# Patient Record
Sex: FEMALE | Race: AMERICAN INDIAN OR ALASKA NATIVE
[De-identification: names, ages, dates, MRNs, and addresses within clinical notes are randomized per-mention and may not be internally consistent; named-entity substitution may affect disease eponyms.]

---

## 2020-10-07 ENCOUNTER — HOSPITAL ENCOUNTER (EMERGENCY)
Dept: HOSPITAL 5 - ED | Age: 35
LOS: 1 days | Discharge: HOME | End: 2020-10-08
Payer: MEDICAID

## 2020-10-07 VITALS — DIASTOLIC BLOOD PRESSURE: 68 MMHG | SYSTOLIC BLOOD PRESSURE: 131 MMHG
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
## 2020-10-07 DIAGNOSIS — Z79.899: ICD-10-CM
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
## 2020-10-07 DIAGNOSIS — S93.402A: Primary | ICD-10-CM
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
## 2020-10-07 DIAGNOSIS — Z79.1: ICD-10-CM

## 2020-10-07 DIAGNOSIS — Z98.890: ICD-10-CM

## 2020-10-07 NOTE — EMERGENCY DEPARTMENT REPORT
Blank Doc





- Documentation


Documentation: 





35-year-old female that presents with left ankle pain s/p twisting it.





This initial assessment/diagnostic orders/clinical plan/treatment(s) is/are 

subject to change based on patient's health status, clinical progression and re-

assessment by fellow clinical providers in the ED.  Further treatment and workup

at subsequent clinical providers discretion.  Patient/guardians urged not to 

elope from the ED as their condition may be serious if not clinically assessed 

and managed.  Initial orders include:


1- Patient sent to [facility] for further evaluation and treatment


2- xrays

## 2020-10-07 NOTE — XRAY REPORT
LEFT ANKLE 3 VIEWS



INDICATION / CLINICAL INFORMATION:

Left ankle pain and numbness after fall yesterday.



COMPARISON:

None available.

 

FINDINGS:



BONES and JOINT(S): No acute fracture or subluxation. No significant arthritis.

SOFT TISSUES: There is mild edema anteriorly and laterally.



ADDITIONAL FINDINGS: None.



IMPRESSION:

Mild left ankle edema.



Signer Name: Curtis Ferguson MD 

Signed: 10/7/2020 9:50 PM

Workstation Name: [id number]

## 2020-10-08 NOTE — EMERGENCY DEPARTMENT REPORT
ED Lower Extremity HPI





- General


Chief Complaint: Extremity Injury, Lower


Stated Complaint: LEFT ANKLE INJURY/FALL


Time Seen by Provider: 10/07/20 20:05


Source: patient


Mode of arrival: Ambulatory


Limitations: No Limitations





- History of Present Illness


Initial Comments: 





Patient is a 35-year-old -American female with no past medical history 

presents to the ED with complaint of acute onset persistent severe left ankle 

pain with swelling after she slipped and twisted her left ankle while rushing 

out of her house and missed a step 4 days ago.  Patient states that the pain and

swelling of worsened in the last 2 days.  Patient states that she has been 

taking over-the-counter pain medications with no relief.  Patient denies head or

neck injuries, dizziness, syncope, seizures, chest pain or shortness of breath, 

back pain, hip pain, headache, numbness and tingling or weakness of left leg or 

knee pain.


MD Complaint: ankle injury (left ankle pain), fall


-: Sudden, days(s) (4)


Injury: Ankle: Left (Left ankle pain)


Type of Injury: blunt, inversion, eversion


Place: home


Severity: severe


Severity scale (0 -10): 8


Improves With: nothing


Worsens With: weight bearing, movement, palpation


Context: fall, direct blow, walking


Associated Symptoms: snap/pop sensation, swelling, able to partially bear 

weight.  denies: numbness, tingling, unable to bear weight, ambulatory





- Related Data


                                  Previous Rx's











 Medication  Instructions  Recorded  Last Taken  Type


 


Ibuprofen [Motrin] 800 mg PO Q8HR PRN #30 tablet 10/08/20 Unknown Rx


 


methOCARBAMOL [Robaxin TAB] 750 mg PO Q8H PRN #24 tablet 10/08/20 Unknown Rx


 


traMADoL [Ultram] 50 mg PO Q6HR PRN #12 tablet 10/08/20 Unknown Rx











                                    Allergies











Allergy/AdvReac Type Severity Reaction Status Date / Time


 


No Known Allergies Allergy   Unverified 10/07/20 20:05














ED Review of Systems


ROS: 


Stated complaint: LEFT ANKLE INJURY/FALL


Other details as noted in HPI





Constitutional: denies: chills, fever


Eyes: denies: eye pain, eye discharge, vision change


ENT: denies: ear pain, throat pain


Respiratory: denies: cough, shortness of breath, wheezing


Cardiovascular: denies: chest pain, palpitations


Endocrine: no symptoms reported


Gastrointestinal: denies: abdominal pain, nausea, diarrhea


Genitourinary: denies: urgency, dysuria, discharge


Musculoskeletal: joint swelling (left ankle pain and swelling), arthralgia (left

 ankle pain and swelling).  denies: back pain


Skin: denies: rash, lesions


Neurological: denies: headache, weakness, paresthesias


Psychiatric: denies: anxiety, depression


Hematological/Lymphatic: denies: easy bleeding, easy bruising





ED Past Medical Hx





- Past Medical History


Previous Medical History?: No





- Surgical History


Additional Surgical History: fallopian tube removal,





- Social History


Smoking Status: Never Smoker


Substance Use Type: None





- Medications


Home Medications: 


                                Home Medications











 Medication  Instructions  Recorded  Confirmed  Last Taken  Type


 


Ibuprofen [Motrin] 800 mg PO Q8HR PRN #30 tablet 10/08/20  Unknown Rx


 


methOCARBAMOL [Robaxin TAB] 750 mg PO Q8H PRN #24 tablet 10/08/20  Unknown Rx


 


traMADoL [Ultram] 50 mg PO Q6HR PRN #12 tablet 10/08/20  Unknown Rx














ED Physical Exam





- General


Limitations: No Limitations


General appearance: alert, in no apparent distress





- Head


Head exam: Present: atraumatic, normocephalic, normal inspection





- Eye


Eye exam: Present: normal appearance, PERRL, EOMI


Pupils: Present: normal accommodation





- ENT


ENT exam: Present: normal exam, normal orophraynx, mucous membranes moist, TM's 

normal bilaterally, normal external ear exam





- Neck


Neck exam: Present: normal inspection, full ROM.  Absent: tenderness, 

lymphadenopathy





- Respiratory


Respiratory exam: Present: normal lung sounds bilaterally.  Absent: respiratory 

distress, wheezes, rales, rhonchi, chest wall tenderness, accessory muscle use, 

decreased breath sounds





- Cardiovascular


Cardiovascular Exam: Present: regular rate, normal rhythm, normal heart sounds. 

 Absent: systolic murmur, diastolic murmur, rubs, gallop





- GI/Abdominal


GI/Abdominal exam: Present: soft, normal bowel sounds.  Absent: tenderness, 

guarding, rebound, hyperactive bowel sounds, hypoactive bowel sounds, 

organomegaly





- Extremities Exam


Extremities exam: Present: normal inspection, full ROM, tenderness (Palpable 

left ankle tenderness and mild swelling), normal capillary refill, joint 

swelling (left ankle swelling).  Absent: calf tenderness





- Back Exam


Back exam: Present: normal inspection, full ROM.  Absent: tenderness, CVA 

tenderness (R), CVA tenderness (L), muscle spasm, paraspinal tenderness





- Neurological Exam


Neurological exam: Present: alert, oriented X3, CN II-XII intact, normal gait, 

reflexes normal





- Psychiatric


Psychiatric exam: Present: normal affect, normal mood





- Skin


Skin exam: Present: warm, dry, intact, normal color.  Absent: rash





ED Course





                                   Vital Signs











  10/07/20





  20:05


 


Temperature 98.5 F


 


Pulse Rate 100 H


 


Respiratory 18





Rate 


 


Blood Pressure 131/68


 


O2 Sat by Pulse 100





Oximetry 














ED Lower Extremity MDM





- Radiology Data


Radiology results: report reviewed, image reviewed





Findings





[facility] 


[address] [place] [address] 





XRay Report 


Signed 





 Patient: KURTIS HUBER MR#: [id number] 


 [id number] 


 : 1985 Acct:[id number] 





 Age/Sex: 35 / F ADM Date: 10/07/20 





 Loc: ED 


 Attending Dr: 








 Ordering Physician: LOLIS WU NP 


 Date of Service: 10/07/20 


 Procedure(s): XR ankle 3+V LT 


 Accession Number(s): [id number] 





 cc: LOLIS WU NP 





 Fluoro Time In Minutes: 





 LEFT ANKLE 3 VIEWS 





INDICATION / CLINICAL INFORMATION: 


Left ankle pain and numbness after fall yesterday. 





COMPARISON: 


None available. 





FINDINGS: 





BONES and JOINT(S): No acute fracture or subluxation. No significant arthritis. 


SOFT TISSUES: There is mild edema anteriorly and laterally. 





ADDITIONAL FINDINGS: None. 





IMPRESSION: 


Mild left ankle edema. 





Signer Name: Curtis Ferguson MD 


Signed: 10/7/2020 9:50 PM 


Workstation Name: [id number] 








 Transcribed By: MN 


 Dictated By: Curtis Ferguson MD 


 Electronically Authenticated By: Curtis Ferguson MD 


 Signed Date/Time: 10/07/20 2150 











 DD/DT: 10/07/20 2149 


 TD/TT: 





- Medical Decision Making





This is a 35-year-old -American female with no past medical history 

presents to the ED with complaint of acute onset persistent severe left ankle 

pain with swelling after she slipped and twisted her left ankle while rushing 

out of her house and missed a step 4 days ago.  Patient states that the pain and

 swelling of worsened in the last 2 days.  Patient states that she has been 

taking over-the-counter pain medications with no relief.  In the ED, patient is 

alert and oriented x3 and is not in distress.  Patient was treated for pain in 

the ED and left ankle x-ray shows no acute fractures or subluxations but soft 

tissue swelling.  Patient left ankle was splinted with Ace wrap and the patient 

was discharged home on pain medications and muscle relaxants and was advised to 

follow-up with her primary care physician in 5 to 7 days for reevaluation or 

return to the ED immediately if symptoms get worse.





- Differential Diagnosis


Ankle sprain; ankle fracture; muscle strain


Critical care attestation.: 


If time is entered above; I have spent that time in minutes in the direct care 

of this critically ill patient, excluding procedure time.








ED Disposition


Clinical Impression: 


Severe sprain of left ankle


Qualifiers:


 Encounter type: initial encounter Qualified Code(s): S93.402A - Sprain of 

unspecified ligament of left ankle, initial encounter





Muscle strain of left ankle


Qualifiers:


 Encounter type: initial encounter Qualified Code(s): S96.912A - Strain of 

unspecified muscle and tendon at ankle and foot level, left foot, initial 

encounter





Disposition:  TO HOME OR SELFCARE


Is pt being admited?: No


Does the pt Need Aspirin: No


Condition: Stable


Instructions:  Muscle Strain (ED), Ankle Sprain (ED)


Additional Instructions: 


The left ankle X-ray shows no acute fracture or subluxations but soft tissue 

swelling. Therefore take medications with food, drink plenty of fluids and 

follow up with your Primary Care Physician in 5-7 days for reevaluation. Return 

to the ED immediately if symptoms get worse.


Prescriptions: 


Ibuprofen [Motrin] 800 mg PO Q8HR PRN #30 tablet


 PRN Reason: Pain , Severe (7-10)


methOCARBAMOL [Robaxin TAB] 750 mg PO Q8H PRN #24 tablet


 PRN Reason: Muscle Spasm


traMADoL [Ultram] 50 mg PO Q6HR PRN #12 tablet


 PRN Reason: Pain


Referrals: 


[facility] [Provider Group] - 3-5 Days


Time of Disposition: 05:16


Print Language: ENGLISH

## 2021-11-17 ENCOUNTER — HOSPITAL ENCOUNTER (EMERGENCY)
Dept: HOSPITAL 5 - ED | Age: 36
Discharge: HOME | End: 2021-11-17
Payer: SELF-PAY

## 2021-11-17 VITALS — DIASTOLIC BLOOD PRESSURE: 67 MMHG | SYSTOLIC BLOOD PRESSURE: 108 MMHG

## 2021-11-17 DIAGNOSIS — Z3A.01: ICD-10-CM

## 2021-11-17 DIAGNOSIS — O99.511: Primary | ICD-10-CM

## 2021-11-17 DIAGNOSIS — J06.9: ICD-10-CM

## 2021-11-17 DIAGNOSIS — Z20.822: ICD-10-CM

## 2021-11-17 DIAGNOSIS — B97.89: ICD-10-CM

## 2021-11-17 LAB
ALBUMIN SERPL-MCNC: 4 G/DL (ref 3.9–5)
ALT SERPL-CCNC: 10 UNITS/L (ref 7–56)
BASOPHILS # (AUTO): 0 K/MM3 (ref 0–0.1)
BASOPHILS NFR BLD AUTO: 0.5 % (ref 0–1.8)
BUN SERPL-MCNC: 7 MG/DL (ref 7–17)
BUN/CREAT SERPL: 14 %
CALCIUM SERPL-MCNC: 9 MG/DL (ref 8.4–10.2)
EOSINOPHIL # BLD AUTO: 0.2 K/MM3 (ref 0–0.4)
EOSINOPHIL NFR BLD AUTO: 3.3 % (ref 0–4.3)
HCT VFR BLD CALC: 39.2 % (ref 30.3–42.9)
HEMOLYSIS INDEX: 6
HGB BLD-MCNC: 12.6 GM/DL (ref 10.1–14.3)
LYMPHOCYTES # BLD AUTO: 1.9 K/MM3 (ref 1.2–5.4)
LYMPHOCYTES NFR BLD AUTO: 29 % (ref 13.4–35)
MCHC RBC AUTO-ENTMCNC: 32 % (ref 30–34)
MCV RBC AUTO: 86 FL (ref 79–97)
MONOCYTES # (AUTO): 0.4 K/MM3 (ref 0–0.8)
MONOCYTES % (AUTO): 5.9 % (ref 0–7.3)
PLATELET # BLD: 392 K/MM3 (ref 140–440)
RBC # BLD AUTO: 4.58 M/MM3 (ref 3.65–5.03)

## 2021-11-17 PROCEDURE — 76817 TRANSVAGINAL US OBSTETRIC: CPT

## 2021-11-17 PROCEDURE — 76801 OB US < 14 WKS SINGLE FETUS: CPT

## 2021-11-17 PROCEDURE — 87400 INFLUENZA A/B EACH AG IA: CPT

## 2021-11-17 PROCEDURE — 81025 URINE PREGNANCY TEST: CPT

## 2021-11-17 PROCEDURE — 80053 COMPREHEN METABOLIC PANEL: CPT

## 2021-11-17 PROCEDURE — 85025 COMPLETE CBC W/AUTO DIFF WBC: CPT

## 2021-11-17 PROCEDURE — 99284 EMERGENCY DEPT VISIT MOD MDM: CPT

## 2021-11-17 PROCEDURE — 71045 X-RAY EXAM CHEST 1 VIEW: CPT

## 2021-11-17 PROCEDURE — 84702 CHORIONIC GONADOTROPIN TEST: CPT

## 2021-11-17 PROCEDURE — 36415 COLL VENOUS BLD VENIPUNCTURE: CPT

## 2021-11-17 NOTE — XRAY REPORT
CHEST 1 VIEW 11/17/2021 10:29 AM



INDICATION / CLINICAL INFORMATION: cough.



COMPARISON: None available.



FINDINGS:



SUPPORT DEVICES: None.

HEART / MEDIASTINUM: No significant abnormality. 

LUNGS / PLEURA: No significant pulmonary or pleural abnormality. Small tubular radiopaque density at 
the right lung base is nonspecific and may represent benign calcification. No pneumothorax. 



ADDITIONAL FINDINGS: No significant additional findings.



IMPRESSION:

1. Small radiopaque density at the right lung base is nonspecific, but likely benign. The remainder o
f the lungs is clear.



Signer Name: Justin Das MD 

Signed: 11/17/2021 10:42 AM

Workstation Name: [id number] [id number]

## 2021-11-17 NOTE — ULTRASOUND REPORT
ULTRASOUND OBSTETRIC 



INDICATION:

cramping, early pregnancy.



TECHNIQUE:

Transabdominal and Transvaginal.



COMPARISON:

None available.



FINDINGS:

GESTATIONAL SAC: Possible early gestational sac is seen along the uterine fundus measuring 2.8 mm, co
nsistent with an estimated age of 5 weeks, 0 days. 

YOLK SAC: Not seen.



EMBRYO/FETUS: Not seen. 





ADNEXA: No significant abnormality.

FREE FLUID: A moderate amount of septated free fluid is seen along the cul-de-sac.



ADDITIONAL FINDINGS: A hypoechoic solid mass is seen along the uterine fundus measuring 1.8 x 1.4 cm,
 possibly representing a fibroid.



IMPRESSION:

1. Possible early intrauterine gestational sac without visualization of a yolk sac or fetal pole as a
sterling. Close clinical and imaging follow-up is recommended.

2. Nonspecific moderate amount of free fluid containing septations.

3. Possible uterine fibroid as above.



Signer Name: Curtis Ferguson MD 

Signed: 11/17/2021 2:36 PM

Workstation Name: [id number]

## 2021-11-17 NOTE — ULTRASOUND REPORT
ULTRASOUND OBSTETRIC 



INDICATION:

cramping, early pregnancy.



TECHNIQUE:

Transabdominal and Transvaginal.



COMPARISON:

None available.



FINDINGS:

GESTATIONAL SAC: Possible early gestational sac is seen along the uterine fundus measuring 2.8 mm, co
nsistent with an estimated age of 5 weeks, 0 days. 

YOLK SAC: Not seen.



EMBRYO/FETUS: Not seen. 





ADNEXA: No significant abnormality.

FREE FLUID: A moderate amount of septated free fluid is seen along the cul-de-sac.



ADDITIONAL FINDINGS: A hypoechoic solid mass is seen along the uterine fundus measuring 1.8 x 1.4 cm,
 possibly representing a fibroid.



IMPRESSION:

1. Possible early intrauterine gestational sac without visualization of a yolk sac or fetal pole as a
sterling. Close clinical and imaging follow-up is recommended.

2. Nonspecific moderate amount of free fluid containing septations.

3. Possible uterine fibroid as above.



Signer Name: Curtis Ferguson MD 

Signed: 11/17/2021 2:36 PM

Workstation Name: [id number]

## 2021-11-17 NOTE — EMERGENCY DEPARTMENT REPORT
ED General Adult HPI





- General


Chief complaint: Upper Respiratory Infection


Stated complaint: FEVER


Time Seen by Provider: 11/17/21 09:10


Source: patient


Mode of arrival: Ambulatory


Limitations: No Limitations





- History of Present Illness


Initial comments: 





36-year-old -American female patient presents with complaints of cough, 

congestion, and body aches and chills x1 week.  She admits to loss of taste and 

smell.  She has not been vaccinated for COVID-19 and has not been tested for 

COVID-19.  She denies any hemoptysis, but states her cough is productive of 

green mucus.  She also reports mild shortness of breath.  No history of 

DVT/PE/cancer, hormone use, recent long travel/surgeries, or leg pain/swelling 

per patient.  She reports some nausea without vomiting and no diarrhea.  She 

states mild lower abdominal cramping, but states she has a history of 

endometriosis and that this is normal for her.  LMP was approximately 3 to 4 

weeks ago per pt.





- Related Data


                                  Previous Rx's











 Medication  Instructions  Recorded  Last Taken  Type


 


Ibuprofen [Motrin] 800 mg PO Q8HR PRN #30 tablet 10/08/20 Unknown Rx


 


methOCARBAMOL [Robaxin TAB] 750 mg PO Q8H PRN #24 tablet 10/08/20 Unknown Rx


 


traMADoL [Ultram] 50 mg PO Q6HR PRN #12 tablet 10/08/20 Unknown Rx











                                    Allergies











Allergy/AdvReac Type Severity Reaction Status Date / Time


 


No Known Allergies Allergy   Verified 11/17/21 08:16














ED Review of Systems


ROS: 


Stated complaint: FEVER


Other details as noted in HPI





Constitutional: denies: chills, fever, malaise


Gastrointestinal: denies: abdominal pain


Genitourinary: denies: frequency, hematuria


Musculoskeletal: denies: back pain


Skin: denies: rash, lesions, change in color





ED Past Medical Hx





- Surgical History


Additional Surgical History: fallopian tube removal,





- Social History


Smoking Status: Never Smoker


Substance Use Type: None





- Medications


Home Medications: 


                                Home Medications











 Medication  Instructions  Recorded  Confirmed  Last Taken  Type


 


Ibuprofen [Motrin] 800 mg PO Q8HR PRN #30 tablet 10/08/20  Unknown Rx


 


methOCARBAMOL [Robaxin TAB] 750 mg PO Q8H PRN #24 tablet 10/08/20  Unknown Rx


 


traMADoL [Ultram] 50 mg PO Q6HR PRN #12 tablet 10/08/20  Unknown Rx














ED Physical Exam





- General


Limitations: No Limitations


General appearance: alert, in no apparent distress





- Head


Head exam: Present: atraumatic, normocephalic





- Eye


Eye exam: Present: normal appearance.  Absent: scleral icterus





- Neck


Neck exam: Present: normal inspection





- Respiratory


Respiratory exam: Present: normal lung sounds bilaterally.  Absent: respiratory 

distress





- Cardiovascular


Cardiovascular Exam: Present: regular rate, normal rhythm





- GI/Abdominal


GI/Abdominal exam: Present: soft, normal bowel sounds.  Absent: distended, 

tenderness, guarding, rebound, rigid





- Neurological Exam


Neurological exam: Present: alert, oriented X3, normal gait





- Psychiatric


Psychiatric exam: Present: normal affect, normal mood





- Skin


Skin exam: Present: warm, dry, intact, normal color.  Absent: rash





ED Course


                                   Vital Signs











  11/17/21 11/17/21





  09:25 09:56


 


Temperature 98.4 F 


 


Pulse Rate 70 


 


Respiratory 18 18





Rate  


 


Blood Pressure 108/67 





[Left]  


 


O2 Sat by Pulse 99 97





Oximetry  














ED Medical Decision Making





- Lab Data


Result diagrams: 


                                 11/17/21 10:38





                                 11/17/21 10:38








                                   Lab Results











  11/17/21 11/17/21 11/17/21 Range/Units





  09:30 10:38 10:38 


 


WBC    6.6  (4.5-11.0)  K/mm3


 


RBC    4.58  (3.65-5.03)  M/mm3


 


Hgb    12.6  (10.1-14.3)  gm/dl


 


Hct    39.2  (30.3-42.9)  %


 


MCV    86  (79-97)  fl


 


MCH    28  (28-32)  pg


 


MCHC    32  (30-34)  %


 


RDW    14.8  (13.2-15.2)  %


 


Plt Count    392  (140-440)  K/mm3


 


Lymph % (Auto)    29.0  (13.4-35.0)  %


 


Mono % (Auto)    5.9  (0.0-7.3)  %


 


Eos % (Auto)    3.3  (0.0-4.3)  %


 


Baso % (Auto)    0.5  (0.0-1.8)  %


 


Lymph # (Auto)    1.9  (1.2-5.4)  K/mm3


 


Mono # (Auto)    0.4  (0.0-0.8)  K/mm3


 


Eos # (Auto)    0.2  (0.0-0.4)  K/mm3


 


Baso # (Auto)    0.0  (0.0-0.1)  K/mm3


 


Seg Neutrophils %    61.3  (40.0-70.0)  %


 


Seg Neutrophils #    4.0  (1.8-7.7)  K/mm3


 


Sodium     (137-145)  mmol/L


 


Potassium     (3.6-5.0)  mmol/L


 


Chloride     ()  mmol/L


 


Carbon Dioxide     (22-30)  mmol/L


 


Anion Gap     mmol/L


 


BUN     (7-17)  mg/dL


 


Creatinine     (0.6-1.2)  mg/dL


 


Estimated GFR     ml/min


 


BUN/Creatinine Ratio     %


 


Glucose     ()  mg/dL


 


Calcium     (8.4-10.2)  mg/dL


 


Total Bilirubin     (0.1-1.2)  mg/dL


 


AST     (5-40)  units/L


 


ALT     (7-56)  units/L


 


Alkaline Phosphatase     ()  units/L


 


Total Protein     (6.3-8.2)  g/dL


 


Albumin     (3.9-5)  g/dL


 


Albumin/Globulin Ratio     %


 


HCG, Quant   816.7 H   (0-4)  mIU/mL


 


Urine HCG, Qual  Positive A    (Negative)  


 


Influenza A (Rapid)     (Negative)  


 


Influenza B (Rapid)     (Negative)  














  11/17/21 11/17/21 Range/Units





  10:38 Unknown 


 


WBC    (4.5-11.0)  K/mm3


 


RBC    (3.65-5.03)  M/mm3


 


Hgb    (10.1-14.3)  gm/dl


 


Hct    (30.3-42.9)  %


 


MCV    (79-97)  fl


 


MCH    (28-32)  pg


 


MCHC    (30-34)  %


 


RDW    (13.2-15.2)  %


 


Plt Count    (140-440)  K/mm3


 


Lymph % (Auto)    (13.4-35.0)  %


 


Mono % (Auto)    (0.0-7.3)  %


 


Eos % (Auto)    (0.0-4.3)  %


 


Baso % (Auto)    (0.0-1.8)  %


 


Lymph # (Auto)    (1.2-5.4)  K/mm3


 


Mono # (Auto)    (0.0-0.8)  K/mm3


 


Eos # (Auto)    (0.0-0.4)  K/mm3


 


Baso # (Auto)    (0.0-0.1)  K/mm3


 


Seg Neutrophils %    (40.0-70.0)  %


 


Seg Neutrophils #    (1.8-7.7)  K/mm3


 


Sodium  137   (137-145)  mmol/L


 


Potassium  3.9   (3.6-5.0)  mmol/L


 


Chloride  102.6   ()  mmol/L


 


Carbon Dioxide  21 L   (22-30)  mmol/L


 


Anion Gap  17   mmol/L


 


BUN  7   (7-17)  mg/dL


 


Creatinine  0.5 L   (0.6-1.2)  mg/dL


 


Estimated GFR  > 60   ml/min


 


BUN/Creatinine Ratio  14   %


 


Glucose  93   ()  mg/dL


 


Calcium  9.0   (8.4-10.2)  mg/dL


 


Total Bilirubin  0.30   (0.1-1.2)  mg/dL


 


AST  11   (5-40)  units/L


 


ALT  10   (7-56)  units/L


 


Alkaline Phosphatase  61   ()  units/L


 


Total Protein  7.5   (6.3-8.2)  g/dL


 


Albumin  4.0   (3.9-5)  g/dL


 


Albumin/Globulin Ratio  1.1   %


 


HCG, Quant    (0-4)  mIU/mL


 


Urine HCG, Qual    (Negative)  


 


Influenza A (Rapid)   Negative  (Negative)  


 


Influenza B (Rapid)   Negative  (Negative)  














- Radiology Data


Radiology results: report reviewed





CHEST 1 VIEW 11/17/2021 10:29 AM  


 


 INDICATION / CLINICAL INFORMATION: cough.  


 


 COMPARISON: None available.  


 


 FINDINGS:  


 


 SUPPORT DEVICES: None.  


 HEART / MEDIASTINUM: No significant abnormality.   


 LUNGS / PLEURA: No significant pulmonary or pleural abnormality. Small tubular 

radiopaque density 


at the right lung base is nonspecific and may represent benign calcification. No

 pneumothorax.   


 


 ADDITIONAL FINDINGS: No significant additional findings.  


 


 IMPRESSION:  


 1. Small radiopaque density at the right lung base is nonspecific, but likely 

benign. The remainder


of the lungs is clear.  


 





ULTRASOUND OBSTETRIC   


 


 INDICATION:  


 cramping, early pregnancy.  


 


 TECHNIQUE:  


 Transabdominal and Transvaginal.  


 


 COMPARISON:  


 None available.  


 


 FINDINGS:  


 GESTATIONAL SAC: Possible early gestational sac is seen along the uterine 

fundus measuring 2.8 mm, 


consistent with an estimated age of 5 weeks, 0 days.   


 YOLK SAC: Not seen.  


 


 EMBRYO/FETUS: Not seen.   


 


 


 ADNEXA: No significant abnormality.  


 FREE FLUID: A moderate amount of septated free fluid is seen along the 

cul-de-sac.  


 


 ADDITIONAL FINDINGS: A hypoechoic solid mass is seen along the uterine fundus 

measuring 1.8 x 1.4 


cm, possibly representing a fibroid.  


 


 IMPRESSION:  


 1. Possible early intrauterine gestational sac without visualization of a yolk 

sac or fetal pole as


above. Close clinical and imaging follow-up is recommended.  


 2. Nonspecific moderate amount of free fluid containing septations.  


 3. Possible uterine fibroid as above.  





- Medical Decision Making








36-year-old -American female patient presents with complaints of cough, 

congestion, and body aches and chills x1 week.  She admits to loss of taste and 

smell.  She has not been vaccinated for COVID-19 and has not been tested for 

COVID-19.  She denies any hemoptysis, but states her cough is productive of 

green mucus.  She also reports mild shortness of breath.  No history of DVT/PE/

cancer, hormone use, recent long travel/surgeries, or leg pain/swelling per 

patient.  She reports some nausea without vomiting and no diarrhea.  She states 

mild lower abdominal cramping, but states she has a history of endometriosis and

 that this is normal for her.  LMP was approximately 3 to 4 weeks ago per pt.





Positive pregnancy noted on UA and beta-hCG noted to be in the 800s


Pelvic ultrasound is negative for IUP or ectopic at this time.  Recommend 

follow-up with OB/GYN within 1 to 2 weeks for repeat ultrasound.


Small density noted on chest x-ray likely appears to be calcification.  Recomm

end patient follows up with PCP for recheck of this within 3 months.  Symptoms 

appear to be viral and likely COVID-19.  Recommend outpatient testing and self 

quarantine until results are back.  Discussed in detail with patient signs and 

symptoms that should prompt immediate return to the ED, she verbalizes 

understanding.  She is otherwise well-appearing, her vitals are normal, she is 

stable for discharge home


Critical care attestation.: 


If time is entered above; I have spent that time in minutes in the direct care 

of this critically ill patient, excluding procedure time.








ED Disposition


Clinical Impression: 


 Viral URI, Person under investigation for COVID-19, Pregnancy





Disposition: 01 HOME / SELF CARE / HOMELESS


Is pt being admited?: No


Condition: Stable


Instructions:  First Trimester of Pregnancy, Easy-to-Read, COVID-19, Prevent the

 Spread of COVID-19 if You Are Sick - [facility]


Referrals: 


[facility] [Other] - 3-5 Days


[facility] [Provider Group] - 3-5 Days


[facility] [Provider Group] - 3-5 Days


Forms:  Work/School Release Form(ED)